# Patient Record
Sex: FEMALE | Race: WHITE
[De-identification: names, ages, dates, MRNs, and addresses within clinical notes are randomized per-mention and may not be internally consistent; named-entity substitution may affect disease eponyms.]

---
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
## 2021-10-02 ENCOUNTER — HOSPITAL ENCOUNTER (EMERGENCY)
Dept: HOSPITAL 92 - ERS | Age: 21
Discharge: HOME | End: 2021-10-02
Payer: COMMERCIAL

## 2021-10-02 DIAGNOSIS — R51.9: ICD-10-CM

## 2021-10-02 DIAGNOSIS — Z20.822: ICD-10-CM

## 2021-10-02 DIAGNOSIS — R41.82: ICD-10-CM

## 2021-10-02 DIAGNOSIS — D72.829: Primary | ICD-10-CM

## 2021-10-02 LAB
ALBUMIN SERPL BCG-MCNC: 4 G/DL (ref 3.5–5)
ALP SERPL-CCNC: 77 U/L (ref 40–110)
ALT SERPL W P-5'-P-CCNC: 15 U/L (ref 8–55)
ANION GAP SERPL CALC-SCNC: 13 MMOL/L (ref 10–20)
APAP SERPL-MCNC: 9 MCG/ML (ref 10–30)
AST SERPL-CCNC: 14 U/L (ref 5–34)
BASOPHILS # BLD AUTO: 0 THOU/UL (ref 0–0.2)
BASOPHILS NFR BLD AUTO: 0.1 % (ref 0–1)
BILIRUB SERPL-MCNC: 0.4 MG/DL (ref 0.2–1.2)
BUN SERPL-MCNC: 8 MG/DL (ref 7–18.7)
CALCIUM SERPL-MCNC: 9.6 MG/DL (ref 7.8–10.44)
CHLORIDE SERPL-SCNC: 104 MMOL/L (ref 98–107)
CO2 SERPL-SCNC: 25 MMOL/L (ref 22–29)
CREAT CL PREDICTED SERPL C-G-VRATE: 0 ML/MIN (ref 70–130)
DRUG SCREEN CUTOFF: (no result)
EOSINOPHIL # BLD AUTO: 0.1 THOU/UL (ref 0–0.7)
EOSINOPHIL NFR BLD AUTO: 0.4 % (ref 0–10)
GLOBULIN SER CALC-MCNC: 3.4 G/DL (ref 2.4–3.5)
GLUCOSE SERPL-MCNC: 95 MG/DL (ref 70–105)
HGB BLD-MCNC: 13.3 G/DL (ref 12–16)
LYMPHOCYTES # BLD: 1.6 THOU/UL (ref 1.2–3.4)
LYMPHOCYTES NFR BLD AUTO: 11 % (ref 21–51)
MAGNESIUM SERPL-MCNC: 2 MG/DL (ref 1.6–2.6)
MCH RBC QN AUTO: 30 PG (ref 27–31)
MCV RBC AUTO: 87.5 FL (ref 78–98)
MONOCYTES # BLD AUTO: 1.3 THOU/UL (ref 0.11–0.59)
MONOCYTES NFR BLD AUTO: 9.1 % (ref 0–10)
NEUTROPHILS # BLD AUTO: 11.5 THOU/UL (ref 1.4–6.5)
NEUTROPHILS NFR BLD AUTO: 79.4 % (ref 42–75)
PLATELET # BLD AUTO: 226 THOU/UL (ref 130–400)
POTASSIUM SERPL-SCNC: 4 MMOL/L (ref 3.5–5.1)
PREGU CONTROL BACKGROUND?: (no result)
PREGU CONTROL BAR APPEAR?: YES
RBC # BLD AUTO: 4.44 MILL/UL (ref 4.2–5.4)
SALICYLATES SERPL-MCNC: (no result) MG/DL (ref 15–30)
SODIUM SERPL-SCNC: 138 MMOL/L (ref 136–145)
SP GR UR STRIP: 1.02 (ref 1–1.04)
WBC # BLD AUTO: 14.5 THOU/UL (ref 4.8–10.8)

## 2021-10-02 PROCEDURE — U0002 COVID-19 LAB TEST NON-CDC: HCPCS

## 2021-10-02 PROCEDURE — 80307 DRUG TEST PRSMV CHEM ANLYZR: CPT

## 2021-10-02 PROCEDURE — 84484 ASSAY OF TROPONIN QUANT: CPT

## 2021-10-02 PROCEDURE — 87086 URINE CULTURE/COLONY COUNT: CPT

## 2021-10-02 PROCEDURE — 96375 TX/PRO/DX INJ NEW DRUG ADDON: CPT

## 2021-10-02 PROCEDURE — 96365 THER/PROPH/DIAG IV INF INIT: CPT

## 2021-10-02 PROCEDURE — 80306 DRUG TEST PRSMV INSTRMNT: CPT

## 2021-10-02 PROCEDURE — 85025 COMPLETE CBC W/AUTO DIFF WBC: CPT

## 2021-10-02 PROCEDURE — 83735 ASSAY OF MAGNESIUM: CPT

## 2021-10-02 PROCEDURE — 36416 COLLJ CAPILLARY BLOOD SPEC: CPT

## 2021-10-02 PROCEDURE — 84443 ASSAY THYROID STIM HORMONE: CPT

## 2021-10-02 PROCEDURE — 93005 ELECTROCARDIOGRAM TRACING: CPT

## 2021-10-02 PROCEDURE — 83605 ASSAY OF LACTIC ACID: CPT

## 2021-10-02 PROCEDURE — 80053 COMPREHEN METABOLIC PANEL: CPT

## 2021-10-02 PROCEDURE — 87040 BLOOD CULTURE FOR BACTERIA: CPT

## 2021-10-02 PROCEDURE — 81025 URINE PREGNANCY TEST: CPT

## 2021-10-02 PROCEDURE — 71045 X-RAY EXAM CHEST 1 VIEW: CPT

## 2021-10-02 PROCEDURE — 81003 URINALYSIS AUTO W/O SCOPE: CPT

## 2021-10-02 PROCEDURE — 96367 TX/PROPH/DG ADDL SEQ IV INF: CPT

## 2021-10-02 PROCEDURE — 96366 THER/PROPH/DIAG IV INF ADDON: CPT

## 2021-10-02 PROCEDURE — 70450 CT HEAD/BRAIN W/O DYE: CPT
